# Patient Record
Sex: MALE | Race: WHITE | NOT HISPANIC OR LATINO | Employment: OTHER | URBAN - METROPOLITAN AREA
[De-identification: names, ages, dates, MRNs, and addresses within clinical notes are randomized per-mention and may not be internally consistent; named-entity substitution may affect disease eponyms.]

---

## 2017-02-07 ENCOUNTER — HISTORICAL (OUTPATIENT)
Dept: RADIOLOGY | Facility: HOSPITAL | Age: 57
End: 2017-02-07

## 2023-12-15 ENCOUNTER — OFFICE VISIT (OUTPATIENT)
Dept: URGENT CARE | Facility: CLINIC | Age: 63
End: 2023-12-15
Payer: MEDICARE

## 2023-12-15 VITALS
HEART RATE: 90 BPM | WEIGHT: 175 LBS | BODY MASS INDEX: 23.7 KG/M2 | DIASTOLIC BLOOD PRESSURE: 96 MMHG | OXYGEN SATURATION: 98 % | RESPIRATION RATE: 20 BRPM | SYSTOLIC BLOOD PRESSURE: 151 MMHG | HEIGHT: 72 IN | TEMPERATURE: 98 F

## 2023-12-15 DIAGNOSIS — A09 DIARRHEA OF INFECTIOUS ORIGIN: Primary | ICD-10-CM

## 2023-12-15 PROCEDURE — 99203 PR OFFICE/OUTPT VISIT, NEW, LEVL III, 30-44 MIN: ICD-10-PCS | Mod: ,,,

## 2023-12-15 PROCEDURE — 99203 OFFICE O/P NEW LOW 30 MIN: CPT | Mod: ,,,

## 2023-12-15 RX ORDER — DOXYCYCLINE 100 MG/1
100 CAPSULE ORAL 2 TIMES DAILY
Qty: 14 CAPSULE | Refills: 0 | Status: SHIPPED | OUTPATIENT
Start: 2023-12-15 | End: 2023-12-22

## 2023-12-15 RX ORDER — MONTELUKAST SODIUM 10 MG/1
10 TABLET ORAL
COMMUNITY

## 2023-12-15 RX ORDER — OMEPRAZOLE 40 MG/1
40 CAPSULE, DELAYED RELEASE ORAL
COMMUNITY

## 2023-12-15 RX ORDER — NEBIVOLOL 10 MG/1
10 TABLET ORAL
COMMUNITY

## 2023-12-15 RX ORDER — PIOGLITAZONEHYDROCHLORIDE 30 MG/1
30 TABLET ORAL
COMMUNITY
Start: 2023-11-01

## 2023-12-15 NOTE — PROGRESS NOTES
Subjective:      Patient ID: Jimenez Díaz is a 63 y.o. male.    Vitals:  height is 6' (1.829 m) and weight is 79.4 kg (175 lb). His oral temperature is 97.9 °F (36.6 °C). His blood pressure is 151/96 (abnormal) and his pulse is 90. His respiration is 20 and oxygen saturation is 98%.     Chief Complaint: Diarrhea (Diarrhea x 1 week. Pt recently ate raw and cooked oysters.)    Patient is a 63-year-old male that presents complaining of diarrhea for the past week after eating raw oysters.  He states that when he takes Imodium it was stopped temporarily but as soon as it was out as of his system he begins again.     Diarrhea         Gastrointestinal:  Positive for diarrhea.      Objective:     Physical Exam   Constitutional: He is oriented to person, place, and time.  Non-toxic appearance. He does not appear ill.   Cardiovascular: Normal rate, regular rhythm, normal heart sounds and normal pulses.   Pulmonary/Chest: Effort normal.   Abdominal: Normal appearance and bowel sounds are normal. Soft. There is no abdominal tenderness.   Neurological: He is alert and oriented to person, place, and time.   Skin: Skin is warm, not diaphoretic and no rash. Capillary refill takes less than 2 seconds.   Psychiatric: His behavior is normal. Mood, judgment and thought content normal.       Assessment:     1. Diarrhea of infectious origin        Plan:     Patient requests to be put on antibiotic now because he is supposed to be cooking for his family next week.     Diarrhea of infectious origin  -     Stool Culture  -     Stool Exam-Ova,Cysts,Parasites; Future; Expected date: 12/15/2023  -     doxycycline (VIBRAMYCIN) 100 MG Cap; Take 1 capsule (100 mg total) by mouth 2 (two) times daily. for 7 days  Dispense: 14 capsule; Refill: 0    We will call you with official stool culture results in the next 4-5 days.